# Patient Record
Sex: MALE | Race: WHITE | Employment: FULL TIME | URBAN - METROPOLITAN AREA
[De-identification: names, ages, dates, MRNs, and addresses within clinical notes are randomized per-mention and may not be internally consistent; named-entity substitution may affect disease eponyms.]

---

## 2019-05-13 ENCOUNTER — HOSPITAL ENCOUNTER (OUTPATIENT)
Facility: CLINIC | Age: 54
Discharge: HOME | End: 2019-05-13
Attending: COLON & RECTAL SURGERY | Admitting: COLON & RECTAL SURGERY
Payer: COMMERCIAL

## 2019-05-13 VITALS
BODY MASS INDEX: 28.23 KG/M2 | OXYGEN SATURATION: 95 % | HEART RATE: 66 BPM | HEIGHT: 74 IN | RESPIRATION RATE: 26 BRPM | DIASTOLIC BLOOD PRESSURE: 81 MMHG | WEIGHT: 220 LBS | SYSTOLIC BLOOD PRESSURE: 131 MMHG

## 2019-05-13 LAB — COLONOSCOPY: NORMAL

## 2019-05-13 PROCEDURE — 88305 TISSUE EXAM BY PATHOLOGIST: CPT | Performed by: COLON & RECTAL SURGERY

## 2019-05-13 PROCEDURE — 45385 COLONOSCOPY W/LESION REMOVAL: CPT | Performed by: COLON & RECTAL SURGERY

## 2019-05-13 PROCEDURE — 27210582 ZZH DEVICE CLIP RESOLUTION, EACH: Performed by: COLON & RECTAL SURGERY

## 2019-05-13 PROCEDURE — 45380 COLONOSCOPY AND BIOPSY: CPT | Mod: PT,XU | Performed by: COLON & RECTAL SURGERY

## 2019-05-13 PROCEDURE — 88305 TISSUE EXAM BY PATHOLOGIST: CPT | Mod: 26 | Performed by: COLON & RECTAL SURGERY

## 2019-05-13 PROCEDURE — G0500 MOD SEDAT ENDO SERVICE >5YRS: HCPCS | Performed by: COLON & RECTAL SURGERY

## 2019-05-13 PROCEDURE — 25000128 H RX IP 250 OP 636: Performed by: COLON & RECTAL SURGERY

## 2019-05-13 PROCEDURE — 45382 COLONOSCOPY W/CONTROL BLEED: CPT | Performed by: COLON & RECTAL SURGERY

## 2019-05-13 RX ORDER — LIDOCAINE 40 MG/G
CREAM TOPICAL
Status: DISCONTINUED | OUTPATIENT
Start: 2019-05-13 | End: 2019-05-13 | Stop reason: HOSPADM

## 2019-05-13 RX ORDER — FENTANYL CITRATE 50 UG/ML
INJECTION, SOLUTION INTRAMUSCULAR; INTRAVENOUS PRN
Status: DISCONTINUED | OUTPATIENT
Start: 2019-05-13 | End: 2019-05-13 | Stop reason: HOSPADM

## 2019-05-13 RX ORDER — CEPHALEXIN 250 MG/1
250 TABLET ORAL 4 TIMES DAILY
COMMUNITY

## 2019-05-13 RX ORDER — ONDANSETRON 2 MG/ML
4 INJECTION INTRAMUSCULAR; INTRAVENOUS
Status: DISCONTINUED | OUTPATIENT
Start: 2019-05-13 | End: 2019-05-13 | Stop reason: HOSPADM

## 2019-05-13 ASSESSMENT — MIFFLIN-ST. JEOR: SCORE: 1902.66

## 2019-05-13 NOTE — H&P
Pre-Endoscopy History and Physical     Greg Lemus MRN# [id number]   YOB: 1965 Age: 53 year old     Date of Procedure: 5/13/2019  Primary care provider: Isaak Sky  Type of Endoscopy: colonoscopy  Reason for Procedure: surveillance  Type of Anesthesia Anticipated: Moderate Sedation    HPI:    Greg is a 53 year old male who will be undergoing the above procedure.      A history and physical has been performed. The patient's medications and allergies have been reviewed. The risks and benefits of the procedure including the risk of bleeding, perforation, and missed lesions as well as the sedation options and risks were discussed with the patient.  All questions were answered and informed consent was obtained.      No Known Allergies     No current facility-administered medications for this encounter.        Medications Prior to Admission   Medication Sig Dispense Refill Last Dose     cephalexin 250 MG TABS Take 250 mg by mouth 4 times daily   5/12/2019 at Unknown time       Patient Active Problem List   Diagnosis     Rectal cancer (H)     Disturbance of skin sensation     Malignant neoplasm of rectum (H)     Rotator cuff (capsule) sprain     Ileostomy status (H)        Past Medical History:   Diagnosis Date     Acute nonsuppurative otitis media, unspecified      Disturbance of skin sensation     parasthesias in right hand     Dysfunction of eustachian tube      Malignant neoplasm of rectum (H)      Rash and other nonspecific skin eruption      Rotator cuff (capsule) sprain      Thoracic or lumbosacral neuritis or radiculitis, unspecified         Past Surgical History:   Procedure Laterality Date     COLONOSCOPY N/A 4/11/2016    Procedure: COMBINED COLONOSCOPY, SINGLE OR MULTIPLE BIOPSY/POLYPECTOMY BY BIOPSY;  Surgeon: Den Márquez MD;  Location:  GI     ear       hisotry of tonisillectomy and adenoidectomy       hisstory of knee arthroplasty       hisstory of laproscopy intestinal resection  "with anastomosis       history of iliostomy       history of shoulder surgery       LAPAROSCOPIC ASSISTED COLECTOMY LEFT (DESCENDING) N/A 4/9/2015    Procedure: LAPAROSCOPIC ASSISTED COLECTOMY LEFT (DESCENDING);  Surgeon: Den Márquez MD;  Location: [facility] OR     ROTATOR CUFF REPAIR RT/LT Right 2014     SIGMOIDOSCOPY FLEXIBLE N/A 7/7/2015    Procedure: SIGMOIDOSCOPY FLEXIBLE;  Surgeon: Den Márquez MD;  Location: [facility]     TAKEDOWN ILEOSTOMY N/A 7/23/2015    Procedure: TAKEDOWN ILEOSTOMY;  Surgeon: Den Márquez MD;  Location: [facility] OR     TONSILLECTOMY       vasectomy         Social History     Tobacco Use     Smoking status: Never Smoker     Smokeless tobacco: Never Used   Substance Use Topics     Alcohol use: Yes     Comment: 5 drinks a week       Family History   Problem Relation Age of Onset     Unknown/Adopted Father         polyps     Diabetes Father      Breast Cancer Mother      Breast Cancer Other      Breast Cancer Maternal Grandmother      Prostate Cancer Paternal Grandfather      Stomach Cancer Paternal Grandmother      Cerebrovascular Disease Brother          PHYSICAL EXAM:   /84   Resp 16   Ht 1.88 m (6' 2\")   Wt 99.8 kg (220 lb)   SpO2 97%   BMI 28.25 kg/m   Estimated body mass index is 28.25 kg/m  as calculated from the following:    Height as of this encounter: 1.88 m (6' 2\").    Weight as of this encounter: 99.8 kg (220 lb).   Mental status - alert and oriented  RESP: lungs clear  CV: RRR  AIRWAY EXAM: Mallampatti Class II (visualization of the soft palate, fauces, and uvula)    IMPRESSION   ASA Class 2 - Mild systemic disease      Signed Electronically by: Den Márquez  May 13, 2019    Colorectal Surgery  [phone] (office)  [phone] (pager)  [email or web address]          "

## 2019-05-14 LAB — COPATH REPORT: NORMAL

## (undated) RX ORDER — FENTANYL CITRATE 50 UG/ML
INJECTION, SOLUTION INTRAMUSCULAR; INTRAVENOUS
Status: DISPENSED
Start: 2019-05-13